# Patient Record
Sex: FEMALE | Race: WHITE
[De-identification: names, ages, dates, MRNs, and addresses within clinical notes are randomized per-mention and may not be internally consistent; named-entity substitution may affect disease eponyms.]

---

## 2018-07-29 ENCOUNTER — HOSPITAL ENCOUNTER (EMERGENCY)
Dept: HOSPITAL 89 - ER | Age: 28
LOS: 1 days | Discharge: HOME | End: 2018-07-30
Payer: SELF-PAY

## 2018-07-29 ENCOUNTER — HOSPITAL ENCOUNTER (OUTPATIENT)
Dept: HOSPITAL 89 - AMB | Age: 28
End: 2018-07-29
Payer: SELF-PAY
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
## 2018-07-29 DIAGNOSIS — R53.83: ICD-10-CM
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
## 2018-07-29 DIAGNOSIS — R41.0: Primary | ICD-10-CM

## 2018-07-29 DIAGNOSIS — F10.920: Primary | ICD-10-CM

## 2018-07-29 LAB — PLATELET COUNT, AUTOMATED: 318 K/UL (ref 150–450)

## 2018-07-29 PROCEDURE — 84295 ASSAY OF SERUM SODIUM: CPT

## 2018-07-29 PROCEDURE — 96374 THER/PROPH/DIAG INJ IV PUSH: CPT

## 2018-07-29 PROCEDURE — 99001 SPECIMEN HANDLING PT-LAB: CPT

## 2018-07-29 PROCEDURE — 82310 ASSAY OF CALCIUM: CPT

## 2018-07-29 PROCEDURE — 99283 EMERGENCY DEPT VISIT LOW MDM: CPT

## 2018-07-29 PROCEDURE — 84520 ASSAY OF UREA NITROGEN: CPT

## 2018-07-29 PROCEDURE — 84132 ASSAY OF SERUM POTASSIUM: CPT

## 2018-07-29 PROCEDURE — 84703 CHORIONIC GONADOTROPIN ASSAY: CPT

## 2018-07-29 PROCEDURE — 82947 ASSAY GLUCOSE BLOOD QUANT: CPT

## 2018-07-29 PROCEDURE — 80305 DRUG TEST PRSMV DIR OPT OBS: CPT

## 2018-07-29 PROCEDURE — 82374 ASSAY BLOOD CARBON DIOXIDE: CPT

## 2018-07-29 PROCEDURE — 82565 ASSAY OF CREATININE: CPT

## 2018-07-29 PROCEDURE — 81001 URINALYSIS AUTO W/SCOPE: CPT

## 2018-07-29 PROCEDURE — 82435 ASSAY OF BLOOD CHLORIDE: CPT

## 2018-07-29 PROCEDURE — 85025 COMPLETE CBC W/AUTO DIFF WBC: CPT

## 2018-07-29 PROCEDURE — 83690 ASSAY OF LIPASE: CPT

## 2018-07-29 PROCEDURE — 80320 DRUG SCREEN QUANTALCOHOLS: CPT
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
## 2018-07-30 VITALS — SYSTOLIC BLOOD PRESSURE: 104 MMHG | DIASTOLIC BLOOD PRESSURE: 64 MMHG
